# Patient Record
Sex: MALE | Race: WHITE | NOT HISPANIC OR LATINO | URBAN - METROPOLITAN AREA
[De-identification: names, ages, dates, MRNs, and addresses within clinical notes are randomized per-mention and may not be internally consistent; named-entity substitution may affect disease eponyms.]

---

## 2021-07-21 ENCOUNTER — WEB ENCOUNTER (OUTPATIENT)
Dept: URBAN - METROPOLITAN AREA CLINIC 70 | Facility: CLINIC | Age: 31
End: 2021-07-21

## 2021-07-21 ENCOUNTER — LAB OUTSIDE AN ENCOUNTER (OUTPATIENT)
Dept: URBAN - METROPOLITAN AREA CLINIC 70 | Facility: CLINIC | Age: 31
End: 2021-07-21

## 2021-07-21 ENCOUNTER — OFFICE VISIT (OUTPATIENT)
Dept: URBAN - METROPOLITAN AREA CLINIC 70 | Facility: CLINIC | Age: 31
End: 2021-07-21
Payer: MEDICARE

## 2021-07-21 DIAGNOSIS — R93.2 ABNORMAL CT SCAN, GALLBLADDER: ICD-10-CM

## 2021-07-21 DIAGNOSIS — K83.8 COMMON BILE DUCT DILATION: ICD-10-CM

## 2021-07-21 PROCEDURE — 99204 OFFICE O/P NEW MOD 45 MIN: CPT | Performed by: NURSE PRACTITIONER

## 2021-07-21 RX ORDER — OXYBUTYNIN CHLORIDE 5 MG/1
1 TABLET TABLET ORAL TWICE A DAY
Status: ACTIVE | COMMUNITY

## 2021-07-21 RX ORDER — CLONAZEPAM 1 MG/1
1 TABLET TABLET ORAL ONCE A DAY
Status: ACTIVE | COMMUNITY

## 2021-07-21 RX ORDER — TRAZODONE HYDROCHLORIDE 150 MG/1
1 TABLET AT BEDTIME TABLET ORAL ONCE A DAY
Status: ACTIVE | COMMUNITY

## 2021-07-21 NOTE — HPI-TODAY'S VISIT:
Patient referred by Dr. Gurvinder Pagan for abnormal CT A/P of gallbladder. Scan on 6/11/21 showed extended gallbladder without gallstones, normal gallbaldder wall thickeness with dilated common bile duct at 0.97 cm.  No obvious filling defects were noted.  CT A/P was ordered due to onset of right mid back with fever/chills.  Has a hx of neurogenic bladder and chronic UTIs and was evaluated by urology that ordered CT A/P.  Voices nausea/vomiting for one day.  Wife also reports onset of fever (highest 104.5) for about one week.  He desciibes his pain as a lump to his right mid back.  Pain was described as punching sensation/soreness that occurred intermittently.  Denies signs of jaundice during this time.  Currently denies pain or n/v.  However, states that eating Krystals did lead to increased abdominal pain a week ago.

## 2021-07-21 NOTE — PHYSICAL EXAM CONSTITUTIONAL:
well developed, well nourished , in no acute distress , ambulating without difficulty with crutches, normal communication ability

## 2021-08-10 ENCOUNTER — OFFICE VISIT (OUTPATIENT)
Dept: URBAN - METROPOLITAN AREA CLINIC 70 | Facility: CLINIC | Age: 31
End: 2021-08-10

## 2021-08-10 RX ORDER — TRAZODONE HYDROCHLORIDE 150 MG/1
1 TABLET AT BEDTIME TABLET ORAL ONCE A DAY
Status: ACTIVE | COMMUNITY

## 2021-08-10 RX ORDER — CLONAZEPAM 1 MG/1
1 TABLET TABLET ORAL ONCE A DAY
Status: ACTIVE | COMMUNITY

## 2021-08-10 RX ORDER — OXYBUTYNIN CHLORIDE 5 MG/1
1 TABLET TABLET ORAL TWICE A DAY
Status: ACTIVE | COMMUNITY

## 2021-08-10 NOTE — HPI-OTHER HISTORIES
Office note from LOV 7/21/21: Patient referred by Dr. Gurvinder Pagan for abnormal CT A/P of gallbladder. Scan on 6/11/21 showed extended gallbladder without gallstones, normal gallbaldder wall thickeness with dilated common bile duct at 0.97 cm.  No obvious filling defects were noted.  CT A/P was ordered due to onset of right mid back with fever/chills.  Has a hx of neurogenic bladder and chronic UTIs and was evaluated by urology that ordered CT A/P.  Voices nausea/vomiting for one day.  Wife also reports onset of fever (highest 104.5) for about one week.  He desciibes his pain as a lump to his right mid back.  Pain was described as punching sensation/soreness that occurred intermittently.  Denies signs of jaundice during this time.  Currently denies pain or n/v.  However, states that eating Krystals did lead to increased abdominal pain a week ago.

## 2021-08-10 NOTE — HPI-TODAY'S VISIT:
Presents today for one month follow up in regards to gallstones. MRCP and ordered after LOV to rule out choledocholithiasis.

## 2021-08-16 LAB
ALBUMIN: 3.7
ALKALINE PHOSPHATASE: 117
ALT (SGPT): 11
AST (SGOT): 11
BILIRUBIN, DIRECT: 0.04
BILIRUBIN, TOTAL: <0.2
PROTEIN, TOTAL: 7.2

## 2021-08-20 ENCOUNTER — OFFICE VISIT (OUTPATIENT)
Dept: URBAN - METROPOLITAN AREA CLINIC 70 | Facility: CLINIC | Age: 31
End: 2021-08-20
Payer: MEDICARE

## 2021-08-20 ENCOUNTER — WEB ENCOUNTER (OUTPATIENT)
Dept: URBAN - METROPOLITAN AREA CLINIC 70 | Facility: CLINIC | Age: 31
End: 2021-08-20

## 2021-08-20 VITALS
DIASTOLIC BLOOD PRESSURE: 83 MMHG | SYSTOLIC BLOOD PRESSURE: 135 MMHG | BODY MASS INDEX: 22.31 KG/M2 | HEART RATE: 123 BPM | HEIGHT: 62 IN | WEIGHT: 121.2 LBS | TEMPERATURE: 98.1 F

## 2021-08-20 DIAGNOSIS — N13.30 HYDRONEPHROSIS, RIGHT: ICD-10-CM

## 2021-08-20 DIAGNOSIS — K83.8 COMMON BILE DUCT DILATATION: ICD-10-CM

## 2021-08-20 DIAGNOSIS — R10.84 GENERALIZED ABDOMINAL PAIN: ICD-10-CM

## 2021-08-20 PROBLEM — 123608004: Status: ACTIVE | Noted: 2021-08-20

## 2021-08-20 PROCEDURE — 99213 OFFICE O/P EST LOW 20 MIN: CPT | Performed by: NURSE PRACTITIONER

## 2021-08-20 RX ORDER — TRAZODONE HYDROCHLORIDE 150 MG/1
1 TABLET AT BEDTIME TABLET ORAL ONCE A DAY
Status: ACTIVE | COMMUNITY

## 2021-08-20 RX ORDER — OXYBUTYNIN CHLORIDE 5 MG/1
1 TABLET TABLET ORAL TWICE A DAY
Status: ACTIVE | COMMUNITY

## 2021-08-20 RX ORDER — CLONAZEPAM 1 MG/1
1 TABLET TABLET ORAL ONCE A DAY
Status: ACTIVE | COMMUNITY

## 2021-08-20 NOTE — HPI-TODAY'S VISIT:
Presents today for one month follow up in regards to gallstones.  MRCP and ordered after LOV to rule out choledocholithiasis.  MRCP on 7/23/21 showed no intrahepatic bile duct dilatation, mildly dilated common bile duct between 7 and 8 mm in dameter.  No intrluminal filling defects were seen to suggest choleddcoholithiasis.  Moderate right sided hydronephrosis was noted which could be congenital or secondary to a ureteropelvic junction stone.  Continues to c/o right flank pain with negative work-up voiced by urology.

## 2021-09-07 ENCOUNTER — DASHBOARD ENCOUNTERS (OUTPATIENT)
Age: 31
End: 2021-09-07

## 2021-09-09 ENCOUNTER — OFFICE VISIT (OUTPATIENT)
Dept: URBAN - METROPOLITAN AREA CLINIC 70 | Facility: CLINIC | Age: 31
End: 2021-09-09

## 2021-09-09 RX ORDER — CLONAZEPAM 1 MG/1
1 TABLET TABLET ORAL ONCE A DAY
Status: ACTIVE | COMMUNITY

## 2021-09-09 RX ORDER — TRAZODONE HYDROCHLORIDE 150 MG/1
1 TABLET AT BEDTIME TABLET ORAL ONCE A DAY
Status: ACTIVE | COMMUNITY

## 2021-09-09 RX ORDER — OXYBUTYNIN CHLORIDE 5 MG/1
1 TABLET TABLET ORAL TWICE A DAY
Status: ACTIVE | COMMUNITY